# Patient Record
Sex: FEMALE | Race: WHITE | NOT HISPANIC OR LATINO
[De-identification: names, ages, dates, MRNs, and addresses within clinical notes are randomized per-mention and may not be internally consistent; named-entity substitution may affect disease eponyms.]

---

## 2022-10-11 PROBLEM — Z00.00 ENCOUNTER FOR PREVENTIVE HEALTH EXAMINATION: Status: ACTIVE | Noted: 2022-10-11

## 2022-10-21 ENCOUNTER — APPOINTMENT (OUTPATIENT)
Dept: PEDIATRIC ORTHOPEDIC SURGERY | Facility: CLINIC | Age: 22
End: 2022-10-21

## 2022-10-21 VITALS — TEMPERATURE: 96.5 F | BODY MASS INDEX: 19.99 KG/M2 | HEIGHT: 65 IN | WEIGHT: 120 LBS

## 2022-10-21 DIAGNOSIS — S13.4XXA SPRAIN OF LIGAMENTS OF CERVICAL SPINE, INITIAL ENCOUNTER: ICD-10-CM

## 2022-10-21 DIAGNOSIS — M41.125 ADOLESCENT IDIOPATHIC SCOLIOSIS, THORACOLUMBAR REGION: ICD-10-CM

## 2022-10-21 PROCEDURE — 72070 X-RAY EXAM THORAC SPINE 2VWS: CPT

## 2022-10-21 PROCEDURE — 99203 OFFICE O/P NEW LOW 30 MIN: CPT

## 2022-10-21 PROCEDURE — 72040 X-RAY EXAM NECK SPINE 2-3 VW: CPT

## 2022-10-22 PROBLEM — M41.125 ADOLESCENT IDIOPATHIC SCOLIOSIS, THORACOLUMBAR REGION: Status: ACTIVE | Noted: 2022-10-22

## 2022-10-24 PROBLEM — S13.4XXA SPRAIN OF LIGAMENTS OF CERVICAL SPINE, INITIAL ENCOUNTER: Status: ACTIVE | Noted: 2022-10-24

## 2022-10-24 NOTE — PHYSICAL EXAM
[de-identified] : On physical examination the patient has left cervical spine muscle spasm and right cervical muscle spasm of a greater degree.  She also has right upper thoracic muscle spasm.  Motor sensory and deep tendon reflex examination of both upper extremities is within normal limits.  Examination of the spine on forward bending test reveals a mild thoracolumbar scoliosis. [de-identified] : X-ray evaluation of the cervical spine on 10/21/2022 (AP and lateral views) reveals no obvious abnormalities.\par \par AP scoliosis x-ray on 10/21/2022 reveals a 16 degree thoracolumbar curve without rotational abnormality.

## 2022-10-24 NOTE — HISTORY OF PRESENT ILLNESS
[de-identified] : This 22-year-old female is here for evaluation of a recent history of pain in the cervical spine as well as in the upper thoracic spine.  This patient had been seen in the past for a scoliosis but she was not seen since 2018.  She has no neurological symptomatology.  She does not recall an injury.  She thinks the pain occurred while she was driving and turning her neck.

## 2022-10-24 NOTE — ASSESSMENT
[FreeTextEntry1] : Cervical sprain\par Thoracolumbar scoliosis with pain\par \par This patient has been given a prescription for physical therapy.  She does not want medication.  She has been advised to return if symptoms persist for the possibility of ordering an MRI.

## 2024-07-01 ENCOUNTER — NON-APPOINTMENT (OUTPATIENT)
Age: 24
End: 2024-07-01